# Patient Record
Sex: FEMALE | Race: WHITE | NOT HISPANIC OR LATINO | Employment: UNEMPLOYED | URBAN - NONMETROPOLITAN AREA
[De-identification: names, ages, dates, MRNs, and addresses within clinical notes are randomized per-mention and may not be internally consistent; named-entity substitution may affect disease eponyms.]

---

## 2023-11-09 ENCOUNTER — OFFICE VISIT (OUTPATIENT)
Dept: FAMILY MEDICINE CLINIC | Facility: CLINIC | Age: 33
End: 2023-11-09
Payer: COMMERCIAL

## 2023-11-09 VITALS
WEIGHT: 198 LBS | DIASTOLIC BLOOD PRESSURE: 66 MMHG | SYSTOLIC BLOOD PRESSURE: 110 MMHG | HEIGHT: 65 IN | BODY MASS INDEX: 32.99 KG/M2 | OXYGEN SATURATION: 99 % | HEART RATE: 70 BPM

## 2023-11-09 DIAGNOSIS — Z80.3 FAMILY HISTORY OF BREAST CANCER: ICD-10-CM

## 2023-11-09 DIAGNOSIS — N64.3 GALACTORRHEA: ICD-10-CM

## 2023-11-09 DIAGNOSIS — N60.11 FIBROCYSTIC BREAST CHANGES, BILATERAL: Primary | ICD-10-CM

## 2023-11-09 DIAGNOSIS — N60.12 FIBROCYSTIC BREAST CHANGES, BILATERAL: Primary | ICD-10-CM

## 2023-11-09 PROBLEM — K82.8 BILIARY DYSKINESIA: Status: ACTIVE | Noted: 2021-04-29

## 2023-11-09 PROCEDURE — 99204 OFFICE O/P NEW MOD 45 MIN: CPT | Performed by: PHYSICIAN ASSISTANT

## 2023-11-09 NOTE — ASSESSMENT & PLAN NOTE
This has been occurring since birth of her last child likely not related to prolactin level however will order labs this date.  This could likely be inflamed milk ducts

## 2023-11-09 NOTE — PROGRESS NOTES
"Chief Complaint  Establish Care and Breast Mass    Subjective        Sabina DICKINSON presents to [facility] PRIMARY [facility]  History of Present Illness  Patient reports today to establish care.  Patient reports she has been seen most recently around 2 years ago by family practice.    Patient reports today with a complaint of bilateral nodules in her breast.  States they are painful at times.  Reports they have been present for about a year.  Patient states they come and go.  Patient reports she was supposed to have a mammogram last year secondary to this issue however was unable to fit it into her schedule.  Patient states she has drainage from bilateral nipples.  States it is clear and white in color.  Patient reports this has been ongoing since the birth of her last child 2-1/2 years ago.  Patient denies any bloody discharge.  Patient reports history of breast cancer in her family.  Patient also reports history of breast reduction around 13 years ago  Breast Mass        Objective   Vital Signs:  /66   Pulse 70   Ht 165.1 cm (65\")   Wt 89.8 kg (198 lb)   SpO2 99%   BMI 32.95 kg/m²   Estimated body mass index is 32.95 kg/m² as calculated from the following:    Height as of this encounter: 165.1 cm (65\").    Weight as of this encounter: 89.8 kg (198 lb).             Physical Exam  Vitals and nursing note reviewed.   Constitutional:       General: She is not in acute distress.     Appearance: Normal appearance.   HENT:      Head: Normocephalic.      Right Ear: Hearing normal.      Left Ear: Hearing normal.   Eyes:      Pupils: Pupils are equal, round, and reactive to light.   Cardiovascular:      Rate and Rhythm: Normal rate and regular rhythm.   Pulmonary:      Effort: Pulmonary effort is normal. No respiratory distress.   Chest:   Breasts:     Right: No nipple discharge or tenderness.      Left: Tenderness present. No nipple discharge.      Comments: Bilateral surgical scars on " breast    Fibrocystic changes bilaterally  Lymphadenopathy:      Upper Body:      Right upper body: No supraclavicular, axillary or pectoral adenopathy.      Left upper body: No supraclavicular, axillary or pectoral adenopathy.   Skin:     General: Skin is warm and dry.   Neurological:      Mental Status: She is alert.   Psychiatric:         Mood and Affect: Mood normal.        Result Review :                   Assessment and Plan   Diagnoses and all orders for this visit:    1. Fibrocystic breast changes, bilateral (Primary)  Assessment & Plan:  We will order mammogram and ultrasound if needed.  Discussed in depth with patient etiology of fibrocystic changes and associated pain.  Recommended decreasing caffeine, chocolate and anti-inflammatories.  She acknowledged understanding    Orders:  -     Mammo Diagnostic Digital Tomosynthesis Bilateral With CAD; Future    2. Family history of breast cancer  Assessment & Plan:  Patient with maternal grandmother with breast cancer age unknown      3. Galactorrhea  Assessment & Plan:  This has been occurring since birth of her last child likely not related to prolactin level however will order labs this date.  This could likely be inflamed milk ducts    Orders:  -     Prolactin; Future  -     Mammo Diagnostic Digital Tomosynthesis Bilateral With CAD; Future  -     TSH; Future  -     Prolactin  -     TSH             Follow Up   Return in about 3 months (around 2/9/2024) for Annual physical.  Patient was given instructions and counseling regarding her condition or for health maintenance advice. Please see specific information pulled into the AVS if appropriate.

## 2023-11-10 ENCOUNTER — TELEPHONE (OUTPATIENT)
Dept: FAMILY MEDICINE CLINIC | Facility: CLINIC | Age: 33
End: 2023-11-10

## 2023-11-10 LAB
PROLACTIN SERPL-MCNC: 10.3 NG/ML (ref 4.8–23.3)
TSH SERPL DL<=0.005 MIU/L-ACNC: 1.32 UIU/ML (ref 0.45–4.5)

## 2023-11-10 NOTE — TELEPHONE ENCOUNTER
Caller: Sabina DICKINSON    Relationship: Self    Best call back number: [phone]     Caller requesting test results: YES    What test was performed: LABS    When was the test performed: 11.9.23    Where was the test performed: OFFICE    Additional notes: PATIENT STATED SHE IS UNABLE TO LOG INTO [employer] TO REVIEW LAB RESULTS.    REQUESTS CALL BACK

## 2023-11-13 ENCOUNTER — TELEPHONE (OUTPATIENT)
Dept: FAMILY MEDICINE CLINIC | Facility: CLINIC | Age: 33
End: 2023-11-13
Payer: COMMERCIAL

## 2024-05-16 ENCOUNTER — OFFICE VISIT (OUTPATIENT)
Dept: FAMILY MEDICINE CLINIC | Facility: CLINIC | Age: 34
End: 2024-05-16
Payer: COMMERCIAL

## 2024-05-16 VITALS
DIASTOLIC BLOOD PRESSURE: 60 MMHG | WEIGHT: 214 LBS | OXYGEN SATURATION: 99 % | HEART RATE: 67 BPM | BODY MASS INDEX: 34.39 KG/M2 | HEIGHT: 66 IN | SYSTOLIC BLOOD PRESSURE: 100 MMHG

## 2024-05-16 DIAGNOSIS — E66.01 MORBIDLY OBESE: ICD-10-CM

## 2024-05-16 DIAGNOSIS — Z00.00 PHYSICAL EXAM: Primary | ICD-10-CM

## 2024-05-16 DIAGNOSIS — Z13.1 SCREENING FOR DIABETES MELLITUS: ICD-10-CM

## 2024-05-16 DIAGNOSIS — K21.9 CHRONIC GERD: ICD-10-CM

## 2024-05-16 DIAGNOSIS — N91.2 AMENORRHEA: ICD-10-CM

## 2024-05-16 PROBLEM — K08.9 POOR DENTITION: Status: ACTIVE | Noted: 2024-05-16

## 2024-05-16 PROCEDURE — 99395 PREV VISIT EST AGE 18-39: CPT | Performed by: PHYSICIAN ASSISTANT

## 2024-05-16 PROCEDURE — 99214 OFFICE O/P EST MOD 30 MIN: CPT | Performed by: PHYSICIAN ASSISTANT

## 2024-05-16 RX ORDER — OMEPRAZOLE 40 MG/1
40 CAPSULE, DELAYED RELEASE ORAL DAILY
Qty: 30 CAPSULE | Refills: 3 | Status: SHIPPED | OUTPATIENT
Start: 2024-05-16

## 2024-05-16 NOTE — ASSESSMENT & PLAN NOTE
Patient's (Body mass index is 34.54 kg/m².) indicates that they are obese (BMI >30) with health conditions that include GERD . Weight is newly identified. BMI  is above average; BMI management plan is completed. We discussed low calorie, low carb based diet program, portion control, increasing exercise, and patient is considering restarting Wellbutrin.  Denies any history of seizure.  Also discussed referral to weight management .

## 2024-05-16 NOTE — ASSESSMENT & PLAN NOTE
Advised patient to schedule an appointment with gynecology as it has been around 6 months since last menstrual cycle.  Patient reports negative pregnancy test.  She does report a diagnosis of PCOS.  Patient declines referral to [facility] states she will schedule appointment on her own.

## 2024-05-16 NOTE — PROGRESS NOTES
"Chief Complaint  Annual Exam    Subjective          Sabina Gaitan presents to [facility] PRIMARY [facility]  History of Present Illness  Patient reports today for physical exam and fasting labs.    Patient reports that she has been having acid reflux nightly for the past 3 to 4 months.  Patient reports she has not taken any medication so far.    Patient reports recent weight gain feels that she has gained around 20 pounds in the past 6 months.  Patient denies any change to her diet or movement.  She would like to know what she can do to help with weight loss.    Patient reports she has not had a menstrual cycle for the past 6 months.  Patient states she did have some spotting and clear discharge but not a complete menstrual cycle.  Patient reports a history of PCOS.  States she has taken 10 pregnancy test at home and they are all negative.      Objective   Vital Signs:   /60   Pulse 67   Ht 167.6 cm (66\")   Wt 97.1 kg (214 lb)   SpO2 99%   BMI 34.54 kg/m²     Body mass index is 34.54 kg/m².    Review of Systems    Health Maintenance   Topic Date Due    TDAP/TD VACCINES (1 - Tdap) Never done    HEPATITIS C SCREENING  Never done    ANNUAL PHYSICAL  Never done    PAP SMEAR  Never done    COVID-19 Vaccine (2 - -24 season) 2023    INFLUENZA VACCINE  2024    BMI FOLLOWUP  2025    Pneumococcal Vaccine 0-64  Aged Out        Past History:  Medical History: has a past medical history of Breech presentation, Migraine, Polycystic ovary syndrome, Screening breast examination, and Urinary tract infection.   Surgical History: has a past surgical history that includes Breast Reduction; Mouth surgery; Leg skin lesion  biopsy / excision (Right); Other surgical history (Bilateral); [place] tooth extraction; Breast Reduction; Tooth extraction; [place] tooth extraction;  section (N/A, 2019);  section (N/A, 2021); Breast surgery (2011); and Cholecystectomy (2021). "   Family History: family history includes Breast cancer in her maternal grandmother; Hypertension in her maternal grandmother.   Social History: reports that she quit smoking about 13 years ago. Her smoking use included cigarettes. She started smoking about 15 years ago. She has a 0.5 pack-year smoking history. She has never used smokeless tobacco. She reports that she does not drink alcohol and does not use drugs.      Current Outpatient Medications:     acetaminophen (TYLENOL) 500 MG tablet, Take 2 tablets by mouth Every 6 (Six) Hours As Needed for Mild Pain., Disp: , Rfl:     ibuprofen (ADVIL,MOTRIN) 600 MG tablet, Take 1 tablet by mouth Every 6 (Six) Hours., Disp: 30 tablet, Rfl: 0    omeprazole (priLOSEC) 40 MG capsule, Take 1 capsule by mouth Daily. For acid reflux, Disp: 30 capsule, Rfl: 3    Allergies: Acyclovir, Ceclor [cefaclor], Codeine, Cranberry, Cranberry extract, and Sulfa antibiotics    Physical Exam  Vitals and nursing note reviewed.   Constitutional:       General: She is not in acute distress.     Appearance: She is obese. She is not ill-appearing.   HENT:      Head: Normocephalic.      Right Ear: Tympanic membrane, ear canal and external ear normal.      Left Ear: Tympanic membrane, ear canal and external ear normal.      Nose: Nose normal.      Mouth/Throat:      Mouth: Mucous membranes are moist.   Eyes:      Pupils: Pupils are equal, round, and reactive to light.   Cardiovascular:      Rate and Rhythm: Normal rate and regular rhythm.      Pulses: Normal pulses.   Pulmonary:      Effort: Pulmonary effort is normal. No respiratory distress.   Abdominal:      General: Bowel sounds are normal.      Palpations: Abdomen is soft.      Tenderness: There is no abdominal tenderness. There is no guarding or rebound.   Musculoskeletal:         General: Normal range of motion.      Cervical back: Normal range of motion.   Skin:     General: Skin is warm and dry.      Capillary Refill: Capillary refill  takes less than 2 seconds.   Neurological:      General: No focal deficit present.      Mental Status: She is oriented to person, place, and time.   Psychiatric:         Mood and Affect: Mood normal.          Result Review :                   Assessment and Plan    Diagnoses and all orders for this visit:    1. Physical exam (Primary)  Assessment & Plan:  Discussed injury prevention, diet and exercise, safe sexual practices, and screening for common diseases. Encouraged use of sunscreen and seatbelts. Encouraged SBE, avoidance of tobacco, limiting alcohol, and yearly dental and eye exams.       Orders:  -     CBC & Differential; Future  -     Comprehensive Metabolic Panel; Future  -     TSH; Future  -     Lipid Panel; Future  -     CBC & Differential  -     Comprehensive Metabolic Panel  -     TSH  -     Lipid Panel    2. Morbidly obese  Assessment & Plan:  Patient's (Body mass index is 34.54 kg/m².) indicates that they are obese (BMI >30) with health conditions that include GERD . Weight is newly identified. BMI  is above average; BMI management plan is completed. We discussed low calorie, low carb based diet program, portion control, increasing exercise, and patient is considering restarting Wellbutrin.  Denies any history of seizure.  Also discussed referral to weight management .       3. Screening for diabetes mellitus  Assessment & Plan:  A1c ordered today    Orders:  -     Hemoglobin A1c; Future  -     Hemoglobin A1c    4. Chronic GERD  Assessment & Plan:  Discussed diet modifications and prescribed omeprazole.  Advised patient to take for the next 2 to 3 weeks for possible relief.    Orders:  -     omeprazole (priLOSEC) 40 MG capsule; Take 1 capsule by mouth Daily. For acid reflux  Dispense: 30 capsule; Refill: 3    5. Amenorrhea  Assessment & Plan:  Advised patient to schedule an appointment with gynecology as it has been around 6 months since last menstrual cycle.  Patient reports negative pregnancy test.   She does report a diagnosis of PCOS.  Patient declines referral to [facility] states she will schedule appointment on her own.          Follow Up   Return in about 1 year (around 5/16/2025) for Annual physical.  Patient was given instructions and counseling regarding her condition or for health maintenance advice. Please see specific information pulled into the AVS if appropriate.     Linnette Munoz PA-C

## 2024-05-16 NOTE — ASSESSMENT & PLAN NOTE
Discussed diet modifications and prescribed omeprazole.  Advised patient to take for the next 2 to 3 weeks for possible relief.

## 2024-05-17 LAB
ALBUMIN SERPL-MCNC: 4.1 G/DL (ref 3.9–4.9)
ALBUMIN/GLOB SERPL: 1.6 {RATIO} (ref 1.2–2.2)
ALP SERPL-CCNC: 60 IU/L (ref 44–121)
ALT SERPL-CCNC: 14 IU/L (ref 0–32)
AST SERPL-CCNC: 20 IU/L (ref 0–40)
BASOPHILS # BLD AUTO: 0 X10E3/UL (ref 0–0.2)
BASOPHILS NFR BLD AUTO: 1 %
BILIRUB SERPL-MCNC: 0.4 MG/DL (ref 0–1.2)
BUN SERPL-MCNC: 12 MG/DL (ref 6–20)
BUN/CREAT SERPL: 14 (ref 9–23)
CALCIUM SERPL-MCNC: 9.5 MG/DL (ref 8.7–10.2)
CHLORIDE SERPL-SCNC: 102 MMOL/L (ref 96–106)
CHOLEST SERPL-MCNC: 162 MG/DL (ref 100–199)
CO2 SERPL-SCNC: 22 MMOL/L (ref 20–29)
CREAT SERPL-MCNC: 0.83 MG/DL (ref 0.57–1)
EGFRCR SERPLBLD CKD-EPI 2021: 96 ML/MIN/1.73
EOSINOPHIL # BLD AUTO: 0.3 X10E3/UL (ref 0–0.4)
EOSINOPHIL NFR BLD AUTO: 4 %
ERYTHROCYTE [DISTWIDTH] IN BLOOD BY AUTOMATED COUNT: 12.8 % (ref 11.7–15.4)
GLOBULIN SER CALC-MCNC: 2.5 G/DL (ref 1.5–4.5)
GLUCOSE SERPL-MCNC: 88 MG/DL (ref 70–99)
HBA1C MFR BLD: 5.1 % (ref 4.8–5.6)
HCT VFR BLD AUTO: 40.1 % (ref 34–46.6)
HDLC SERPL-MCNC: 44 MG/DL
HGB BLD-MCNC: 13.3 G/DL (ref 11.1–15.9)
IMM GRANULOCYTES # BLD AUTO: 0 X10E3/UL (ref 0–0.1)
IMM GRANULOCYTES NFR BLD AUTO: 0 %
LDLC SERPL CALC-MCNC: 101 MG/DL (ref 0–99)
LYMPHOCYTES # BLD AUTO: 1.4 X10E3/UL (ref 0.7–3.1)
LYMPHOCYTES NFR BLD AUTO: 21 %
MCH RBC QN AUTO: 28.7 PG (ref 26.6–33)
MCHC RBC AUTO-ENTMCNC: 33.2 G/DL (ref 31.5–35.7)
MCV RBC AUTO: 87 FL (ref 79–97)
MONOCYTES # BLD AUTO: 0.5 X10E3/UL (ref 0.1–0.9)
MONOCYTES NFR BLD AUTO: 8 %
NEUTROPHILS # BLD AUTO: 4.3 X10E3/UL (ref 1.4–7)
NEUTROPHILS NFR BLD AUTO: 66 %
PLATELET # BLD AUTO: 226 X10E3/UL (ref 150–450)
POTASSIUM SERPL-SCNC: 5 MMOL/L (ref 3.5–5.2)
PROT SERPL-MCNC: 6.6 G/DL (ref 6–8.5)
RBC # BLD AUTO: 4.63 X10E6/UL (ref 3.77–5.28)
SODIUM SERPL-SCNC: 137 MMOL/L (ref 134–144)
TRIGL SERPL-MCNC: 90 MG/DL (ref 0–149)
TSH SERPL DL<=0.005 MIU/L-ACNC: 1.5 UIU/ML (ref 0.45–4.5)
VLDLC SERPL CALC-MCNC: 17 MG/DL (ref 5–40)
WBC # BLD AUTO: 6.5 X10E3/UL (ref 3.4–10.8)

## 2024-05-30 DIAGNOSIS — E66.01 MORBIDLY OBESE: Primary | ICD-10-CM

## 2024-07-09 DIAGNOSIS — E66.01 MORBIDLY OBESE: Primary | ICD-10-CM

## 2024-07-09 RX ORDER — BUPROPION HYDROCHLORIDE 150 MG/1
150 TABLET ORAL DAILY
Qty: 30 TABLET | Refills: 1 | Status: SHIPPED | OUTPATIENT
Start: 2024-07-09

## 2024-08-19 DIAGNOSIS — G89.29 CHRONIC PAIN OF BOTH KNEES: Primary | ICD-10-CM

## 2024-08-19 DIAGNOSIS — M25.561 CHRONIC PAIN OF BOTH KNEES: Primary | ICD-10-CM

## 2024-08-19 DIAGNOSIS — M25.562 CHRONIC PAIN OF BOTH KNEES: Primary | ICD-10-CM

## 2024-09-12 DIAGNOSIS — E66.01 MORBIDLY OBESE: ICD-10-CM

## 2024-09-12 RX ORDER — BUPROPION HYDROCHLORIDE 150 MG/1
150 TABLET ORAL DAILY
Qty: 30 TABLET | Refills: 1 | Status: SHIPPED | OUTPATIENT
Start: 2024-09-12

## 2024-11-14 DIAGNOSIS — E66.01 MORBIDLY OBESE: ICD-10-CM

## 2024-11-14 RX ORDER — BUPROPION HYDROCHLORIDE 150 MG/1
TABLET ORAL
Qty: 60 TABLET | Refills: 0 | Status: SHIPPED | OUTPATIENT
Start: 2024-11-14

## 2025-01-10 DIAGNOSIS — E66.01 MORBIDLY OBESE: ICD-10-CM

## 2025-01-10 RX ORDER — BUPROPION HYDROCHLORIDE 150 MG/1
TABLET ORAL
Qty: 60 TABLET | Refills: 0 | Status: SHIPPED | OUTPATIENT
Start: 2025-01-10

## 2025-02-10 DIAGNOSIS — E66.01 MORBIDLY OBESE: ICD-10-CM

## 2025-02-10 RX ORDER — BUPROPION HYDROCHLORIDE 150 MG/1
TABLET ORAL
Qty: 60 TABLET | Refills: 0 | Status: SHIPPED | OUTPATIENT
Start: 2025-02-10

## 2025-03-19 ENCOUNTER — E-VISIT (OUTPATIENT)
Dept: FAMILY MEDICINE CLINIC | Facility: TELEHEALTH | Age: 35
End: 2025-03-19
Payer: COMMERCIAL

## 2025-03-19 NOTE — E-VISIT TREATED
Date: 2025 11:27:28  Clinician: Hayde Rodgers  Clinician NPI: [id number]  Patient: Sabina Gaitan  Patient : 1990  Patient Address: [address] [place] [address]  Patient Phone: [phone]  Visit Protocol: Bacterial vaginosis  Patient Summary:  Sabina is a 34 year old ( : 1990 ) female who initiated a visit for bacterial vaginosis.     Sabina began noticing vaginal discharge and vaginal pruritus 3-6 days ago. The discharge does not have a fishy smell.    Symptom details     Vaginal discharge: The discharge is gray in color and is unspecified in appearance.    Sabina denies having pelvic pain, increased urinary frequency, increased urgency to urinate, vaginal burning, abdominal pain, and genital lesions. Sabina also denies   feeling feverish.   Sabina reports they have not taken any medications for this current episode.   Precipitating events  Sabina reports the use of vaginal hygiene products.   Sabina reports they have not been sexually active in the past 90 days.   Sabina denies   having an IUD placed in the last 60 days and the use of vaginal lubricants.   Pertinent medical history  Sabina denies a history of bacterial vaginosis.   Sabina denies taking antibiotics in the past 2 weeks. Preferred medication route: Pill   Sabina denies   having a sexually transmitted infection.   Sabina has had a yeast infection in the past.   STI testing status: Unsure when they were last tested and reports they do not have an STI.   Sabina does not have diabetes.   Sabina has not had pelvic surgery or a   procedure in the past 6 weeks.   Sabina denies having immunosuppressive conditions (e.g., chemotherapy, HIV, organ transplant, long-term use of steroids or other immunosuppressive medications, splenectomy).   Sabina does not smoke or use smokeless tobacco.   Sabina does not vape or use other e-cigarette products.   Sabina denies pregnancy and denies breastfeeding.   Additional information as reported by the patient (free text): I  "tried a new type of wipe and that's when the itching began. I tried an over the   counter yeast infection medication and it didn't help.     MEDICATIONS: bupropion HCl oral, acetaminophen oral, ibuprofen oral, omeprazole oral, ALLERGIES: cranberry, acyclovir, cefaclor, codeine, sulfasalazine  Clinician Response:  Dear Sabina,  Based on the information you have provided, you have bacterial vaginosis. Bacterial vaginosis is the most common vaginal infection among women of childbearing age. This condition is notorious for causing severe   complications related to the reproductive health of women. Bacterial vaginosis can occur when the usual balance of \"good\" and \"bad\" bacteria that reside in the vagina is disrupted with an overgrowth of the bad bacteria.   Medication information  I am   prescribing:     Metronidazole 500 mg oral tablet. Take 1 tablet by mouth every 12 hours for 7 days. There are no refills with this prescription.   Complete your entire course of medication, even if the symptoms go away. If you stop early, the infection could come back.    Self care  Personal lubricants containing chlorhexidine gluconate (Conceptrol(r), K-Y Jelly, and Surgilube(r)) significantly inhibit the good bacteria, allowing bad bacteria to grow, leading to bacterial vaginosis. Lubricants such as Astroglide(r)   Liquid, Good Clean Love Almost Naked, and K-Y Warming Jelly do not cause this effect.      Steps you can take to prevent bacterial vaginosis:     Taking oral probiotics (especially Lactobacillus rhamnosus and Lactobacillus reuteri) can help to improve vaginal health.    Do not wash the vagina with scented or douching products or use vaginal deodorants. These upset the balance of good and harmful bacteria in your vagina.    Using a condom during sex    Limiting your number of sex partners    Using a dental dam during sex. A dental dam is a thin piece of latex that is placed over the vagina before oral sex.    Cleaning or " covering sex toys with condoms each time before use      When to seek care  With treatment, symptoms can improve within 4-5 days with complete clearing in 8-10 days.  Please make an appointment to be seen in a clinic or urgent care if any of the following occur:     If you have discomfort while urinating but aren't experiencing changes in your vaginal discharge, painful urination may be a sign of a urinary tract infection (UTI) or STI    If you are having pain in the pelvic region (in the lower belly area), this is suggestive of conditions affecting the womb (uterus) itself, such as pelvic inflammatory disease or endometriosis     If your symptoms have not improved in 5 days or not resolved in 10 days     It is possible to have an allergic reaction to an antibiotic even if you have not had one in the past. If you notice a new rash, significant swelling, or difficulty breathing, stop taking this medication immediately and go to a clinic or urgent care.   Diagnosis: Bacterial vaginosis  Diagnosis ICD: N76.0    Follow up instructions:  ATTENTION: If you have been prescribed medications, your prescriptions will not be sent until you choose your pharmacy. To do so open the link within your notification, or go to [employer] and click eVisit in the menu to open your   treatment plan. From there, you can select your pharmacy at the bottom of your after visit summary. You can also go to [email or web address]   Prescriptions  Prescription: metronidazole 500 mg oral tablet, take 1 tablet by mouth every 12 hours for 7 days  Sent To: [facility] - [phone] - [address] [place] [address]  Prescription: fluconazole 150 mg oral tablet, take 1 tablet by mouth when you get medication, then 1 tablet in 3 days  Sent To: [facility] - [phone] - [address] [place] [address]

## 2025-03-20 ENCOUNTER — E-VISIT (OUTPATIENT)
Dept: ADMINISTRATIVE | Facility: OTHER | Age: 35
End: 2025-03-20
Payer: COMMERCIAL

## 2025-03-20 NOTE — E-VISIT ESCALATED
Status: Referred Out  Date: 2025 15:14:46  Acuity Level: Within 24 hours  Referral message:  We're sorry you are not feeling well. Your safety is important to us. Bacterial vaginosis generally does not cause open sores or blisters around the vagina. These could be a sign of another type of infection. Additional information and   a physical exam are needed to find the cause of your symptoms and the most effective treatment for you.  For the most appropriate care, please be seen:   At a clinic or an urgent care  Within 24 hours   You won't be charged for this visit.We hope you   feel better soon!   Patient: Sabina Gaitan  Patient : 1990  Patient Address: [address] [place] [address]  Patient Phone: [phone]  Clinician Response: Unavailable  Diagnosis: Unavailable  Diagnosis ICD: Unavailable     Patient Interview Questions and Responses:  Clinical Protocol: Bacterial vaginosis  Please select the reason for your visit today.: Bacterial vaginosis  When did your symptoms start?: 3-6 days ago  Do you have any of the following symptoms? Unusual vaginal discharge: No  Do you have any of the following symptoms? Itching in the vaginal area: Yes  Do you have any of the following symptoms? Lesions or sores outside the vaginal area : Yes

## 2025-04-01 DIAGNOSIS — E66.01 MORBIDLY OBESE: ICD-10-CM

## 2025-04-01 RX ORDER — BUPROPION HYDROCHLORIDE 150 MG/1
150 TABLET ORAL EVERY MORNING
Qty: 60 TABLET | Refills: 0 | Status: SHIPPED | OUTPATIENT
Start: 2025-04-01

## 2025-04-10 DIAGNOSIS — E66.01 MORBIDLY OBESE: ICD-10-CM

## 2025-04-10 RX ORDER — BUPROPION HYDROCHLORIDE 150 MG/1
TABLET ORAL
Qty: 60 TABLET | Refills: 0 | OUTPATIENT
Start: 2025-04-10

## 2025-05-29 DIAGNOSIS — E66.01 MORBIDLY OBESE: ICD-10-CM

## 2025-06-02 RX ORDER — BUPROPION HYDROCHLORIDE 150 MG/1
150 TABLET ORAL EVERY MORNING
Qty: 30 TABLET | Refills: 0 | Status: SHIPPED | OUTPATIENT
Start: 2025-06-02